# Patient Record
(demographics unavailable — no encounter records)

---

## 2025-01-22 NOTE — PHYSICAL EXAM
[Normal Appearance] : normal appearance [Well Groomed] : well groomed [General Appearance - In No Acute Distress] : no acute distress [Edema] : no peripheral edema [Respiration, Rhythm And Depth] : normal respiratory rhythm and effort [Exaggerated Use Of Accessory Muscles For Inspiration] : no accessory muscle use [Abdomen Soft] : soft [Abdomen Tenderness] : non-tender [Costovertebral Angle Tenderness] : no ~M costovertebral angle tenderness [Urinary Bladder Findings] : the bladder was normal on palpation [Normal Station and Gait] : the gait and station were normal for the patient's age [] : no rash [No Focal Deficits] : no focal deficits [Oriented To Time, Place, And Person] : oriented to person, place, and time [Affect] : the affect was normal [Mood] : the mood was normal [No Palpable Adenopathy] : no palpable adenopathy [de-identified] : circumcised, circumfrential swelling of distal penile skin with pinpoint tenderness on distal left lateral penile shaft

## 2025-01-22 NOTE — ASSESSMENT
[FreeTextEntry1] : 22 yo male with pain at distal penile shaft and penile skin swelling that has worsened over the past few days. No mechanism of trauma given patient was asleep and he has had erection however causing pain. He will continue supportive care and will send steroid cream to improve swelling. Also instructed to apply gentle compression   Plan Reviewed labs from recent ED visit including negative culture and STI testing Urinalysis Betamethasone cream bid NSAIDs, ice, gentle compression Instructed to go to ED if pain worsens and he should get urgent penile MRI Lengthy discussion regarding mechanism of action, discussed with patient that given timing of pain as well as no ecchymosis and since he has had some erections it is unlikely to be penile fracture. However, can only rule out with penile mri, patient understands and will continue supportive care   Patient is being seen today for evaluation and management of a chronic and longitudinal ongoing condition and I am of the primary treating physician.

## 2025-01-22 NOTE — HISTORY OF PRESENT ILLNESS
[FreeTextEntry1] : 22 yo male who went to ED 2 days ago after waking up with severe penile pain and swelling of distal penile skin. STI testing at that time negative. He was instructed to ice and take NSAIDs but states swelling has worsened and pain slightly worse. He states he has not had intercourse in over a week and was not masturbating the night before. He has no urinary symptoms.   States he has had initiation of erection and penis does get slightly erect but develops pain and then becomes flaccid